# Patient Record
Sex: FEMALE | Race: WHITE | NOT HISPANIC OR LATINO | Employment: FULL TIME | URBAN - METROPOLITAN AREA
[De-identification: names, ages, dates, MRNs, and addresses within clinical notes are randomized per-mention and may not be internally consistent; named-entity substitution may affect disease eponyms.]

---

## 2023-01-24 PROBLEM — R39.9 UTI SYMPTOMS: Status: ACTIVE | Noted: 2023-01-24

## 2023-01-24 PROBLEM — R31.0 GROSS HEMATURIA: Status: ACTIVE | Noted: 2023-01-24

## 2023-01-24 PROBLEM — N20.0 CALCULUS OF KIDNEY: Status: ACTIVE | Noted: 2023-01-24

## 2023-01-24 PROBLEM — R11.0 NAUSEA IN ADULT: Status: ACTIVE | Noted: 2023-01-24

## 2023-01-24 PROBLEM — F41.1 GENERALIZED ANXIETY DISORDER: Status: ACTIVE | Noted: 2023-01-24

## 2023-01-24 PROBLEM — R35.0 URINE FREQUENCY: Status: ACTIVE | Noted: 2023-01-24

## 2023-01-24 PROBLEM — N20.1 LEFT URETERAL STONE: Status: ACTIVE | Noted: 2023-01-24

## 2023-01-24 PROBLEM — N39.41 URGE INCONTINENCE OF URINE: Status: ACTIVE | Noted: 2023-01-24

## 2023-01-24 PROBLEM — R32 URINARY INCONTINENCE IN FEMALE: Status: ACTIVE | Noted: 2023-01-24

## 2023-01-24 PROBLEM — I10 BENIGN ESSENTIAL HTN: Status: ACTIVE | Noted: 2023-01-24

## 2023-01-24 RX ORDER — ALPRAZOLAM 1 MG/1
1 TABLET ORAL 2 TIMES DAILY
COMMUNITY
Start: 2022-07-18 | End: 2023-05-07 | Stop reason: SDUPTHER

## 2023-01-24 RX ORDER — METOPROLOL TARTRATE 50 MG/1
TABLET ORAL
COMMUNITY
Start: 2020-08-03 | End: 2023-03-07 | Stop reason: ALTCHOICE

## 2023-01-24 RX ORDER — HYDROCHLOROTHIAZIDE 12.5 MG/1
1 TABLET ORAL DAILY
COMMUNITY
Start: 2021-11-02 | End: 2023-03-25 | Stop reason: SDUPTHER

## 2023-01-24 RX ORDER — HYDROXYZINE HYDROCHLORIDE 50 MG/1
50 TABLET, FILM COATED ORAL
COMMUNITY
Start: 2020-08-03 | End: 2023-06-05 | Stop reason: ALTCHOICE

## 2023-01-24 RX ORDER — METOPROLOL SUCCINATE 50 MG/1
1 TABLET, EXTENDED RELEASE ORAL DAILY
COMMUNITY
Start: 2021-02-04 | End: 2023-03-07 | Stop reason: SDUPTHER

## 2023-01-24 RX ORDER — FLUOXETINE HYDROCHLORIDE 20 MG/1
1-2 CAPSULE ORAL DAILY
COMMUNITY
Start: 2021-02-04 | End: 2023-06-05 | Stop reason: SDUPTHER

## 2023-01-24 RX ORDER — LOSARTAN POTASSIUM 50 MG/1
1 TABLET ORAL DAILY
COMMUNITY
Start: 2021-02-04 | End: 2023-06-05 | Stop reason: SDUPTHER

## 2023-03-07 ENCOUNTER — OFFICE VISIT (OUTPATIENT)
Dept: PRIMARY CARE | Facility: CLINIC | Age: 58
End: 2023-03-07
Payer: COMMERCIAL

## 2023-03-07 VITALS
HEIGHT: 66 IN | HEART RATE: 72 BPM | TEMPERATURE: 97.1 F | WEIGHT: 163.8 LBS | SYSTOLIC BLOOD PRESSURE: 130 MMHG | DIASTOLIC BLOOD PRESSURE: 92 MMHG | OXYGEN SATURATION: 98 % | BODY MASS INDEX: 26.33 KG/M2

## 2023-03-07 DIAGNOSIS — Z00.00 ROUTINE GENERAL MEDICAL EXAMINATION AT A HEALTH CARE FACILITY: ICD-10-CM

## 2023-03-07 DIAGNOSIS — I10 BENIGN ESSENTIAL HTN: ICD-10-CM

## 2023-03-07 DIAGNOSIS — L70.0 ACNE VULGARIS: ICD-10-CM

## 2023-03-07 DIAGNOSIS — F41.1 GENERALIZED ANXIETY DISORDER: ICD-10-CM

## 2023-03-07 DIAGNOSIS — Z23 ENCOUNTER FOR IMMUNIZATION: Primary | ICD-10-CM

## 2023-03-07 PROCEDURE — 3075F SYST BP GE 130 - 139MM HG: CPT | Performed by: FAMILY MEDICINE

## 2023-03-07 PROCEDURE — 1036F TOBACCO NON-USER: CPT | Performed by: FAMILY MEDICINE

## 2023-03-07 PROCEDURE — 99396 PREV VISIT EST AGE 40-64: CPT | Performed by: FAMILY MEDICINE

## 2023-03-07 PROCEDURE — 3080F DIAST BP >= 90 MM HG: CPT | Performed by: FAMILY MEDICINE

## 2023-03-07 RX ORDER — METOPROLOL SUCCINATE 50 MG/1
50 TABLET, EXTENDED RELEASE ORAL DAILY
Qty: 90 TABLET | Refills: 1 | Status: SHIPPED | OUTPATIENT
Start: 2023-03-07 | End: 2023-06-05 | Stop reason: SDUPTHER

## 2023-03-07 RX ORDER — TRETINOIN 1 MG/G
CREAM TOPICAL NIGHTLY
Qty: 45 G | Refills: 5 | Status: SHIPPED | OUTPATIENT
Start: 2023-03-07 | End: 2023-06-05 | Stop reason: SDUPTHER

## 2023-03-07 ASSESSMENT — PROMIS GLOBAL HEALTH SCALE
RATE_SOCIAL_SATISFACTION: GOOD
RATE_AVERAGE_PAIN: 0
CARRYOUT_PHYSICAL_ACTIVITIES: COMPLETELY
RATE_GENERAL_HEALTH: VERY GOOD
RATE_QUALITY_OF_LIFE: VERY GOOD
EMOTIONAL_PROBLEMS: SOMETIMES
CARRYOUT_SOCIAL_ACTIVITIES: GOOD
RATE_MENTAL_HEALTH: GOOD
RATE_AVERAGE_FATIGUE: MILD
RATE_PHYSICAL_HEALTH: GOOD

## 2023-03-07 ASSESSMENT — PAIN SCALES - GENERAL: PAINLEVEL: 0-NO PAIN

## 2023-03-07 NOTE — PROGRESS NOTES
N Subjective   Patient ID: Aleshia Crespo is a 57 y.o. female who presents for Annual Exam (Pt. Presents for CPE ).  Here for routine physical and  medication follow up.  Doing well.  Work has been good.  In menopause  Exercises regularly.  Wears seatbelt.  Had cologuard in 2022, pap in 2021.  TDaP in 2013.    For anxiety, continues BZD.  Due for  Urine Drug Screen and Controlled Substance Agreement  I have personally reviewed the OARRS report for this patient.  This report is documented into the EMR.  I have considered the risks of abuse, dependence, addiction and diversion.      Anxiety  Presents for follow-up visit. Symptoms include palpitations. Patient reports no chest pain, decreased concentration, depressed mood, dizziness, nervous/anxious behavior, shortness of breath or suicidal ideas. Symptoms occur occasionally. The quality of sleep is good.     Compliance with medications is %.   Hypertension  This is a chronic problem. The current episode started more than 1 year ago. The problem is unchanged. The problem is controlled. Associated symptoms include anxiety, headaches and palpitations. Pertinent negatives include no chest pain or shortness of breath. The current treatment provides significant improvement. There are no compliance problems.        Review of Systems   Constitutional:  Negative for fatigue and fever.   HENT:  Positive for congestion. Negative for hearing loss and rhinorrhea.    Respiratory:  Negative for cough and shortness of breath.    Cardiovascular:  Positive for palpitations. Negative for chest pain.   Gastrointestinal:  Negative for abdominal pain, blood in stool, constipation and diarrhea.   Genitourinary:  Negative for dysuria and frequency.   Musculoskeletal:  Negative for arthralgias and back pain.   Skin:  Negative for rash.   Neurological:  Positive for headaches. Negative for dizziness and seizures.   Psychiatric/Behavioral:  Positive for dysphoric mood. Negative for  "decreased concentration and suicidal ideas. The patient is not nervous/anxious.        Objective   BP (!) 130/92 (BP Location: Left arm, Patient Position: Sitting, BP Cuff Size: Adult)   Pulse 72   Temp 36.2 °C (97.1 °F) (Temporal)   Ht 1.676 m (5' 6\")   Wt 74.3 kg (163 lb 12.8 oz)   SpO2 98%   BMI 26.44 kg/m²     Physical Exam  Constitutional:       General: She is not in acute distress.     Appearance: Normal appearance.   HENT:      Head: Normocephalic and atraumatic.      Right Ear: Tympanic membrane and ear canal normal.      Left Ear: Tympanic membrane and ear canal normal.   Eyes:      Pupils: Pupils are equal, round, and reactive to light.   Cardiovascular:      Rate and Rhythm: Tachycardia present.      Heart sounds: Normal heart sounds. No murmur heard.  Pulmonary:      Effort: Pulmonary effort is normal.      Breath sounds: Normal breath sounds.   Chest:   Breasts:     Right: Normal.      Left: Normal.   Abdominal:      General: Bowel sounds are normal.      Palpations: Abdomen is soft. There is no mass.      Tenderness: There is no abdominal tenderness.   Genitourinary:     General: Normal vulva.      Vagina: Normal.      Cervix: Normal.      Uterus: Normal.       Adnexa: Right adnexa normal and left adnexa normal.   Musculoskeletal:      Cervical back: No rigidity.   Lymphadenopathy:      Cervical: No cervical adenopathy.   Skin:     General: Skin is warm and dry.   Neurological:      Mental Status: She is alert. Mental status is at baseline.      Gait: Gait normal.   Psychiatric:         Mood and Affect: Mood normal.         Behavior: Behavior normal.         Thought Content: Thought content normal.         Judgment: Judgment normal.           Assessment/Plan   Problem List Items Addressed This Visit    None         "

## 2023-03-07 NOTE — PATIENT INSTRUCTIONS
Immunizations recommended:  --Flu shot every fall.  --Shingrix is the shingles vaccine. It can be done at the pharmacy. You may want to check insurance regarding coverage.  --Tetanus every 10 years. Yours was done in 2013, so updated today.  --Covid vaccine.     If your pap smears have been normal, you can do them every 3-5 years, and stop after the age of 65.  Yours was done today.     Colonoscopy should be done every 10 years after the age of 45, unless there was a previous abnormality, or family history of colon cancer.  Alternative is cologuard every 3 years, which you had in March 2022.     Mammogram screening recommendations are changing, but at this time, I recommend a mammogram every 1-2 years in your 40's, and yearly after the age of 50. Having a family history of breast cancer may change that.     You should try to get 150 minutes of exercise weekly.  Be sure to eat a diet rich in fruits and vegetables, and low in saturated fats and sodium.  Strive for a healthy BMI of less than 25.      Make sure to wear sun screen when outside, and check for changing or unusual moles.     Pre-menopause recommendations are for 1000 mg calcium and 1000 units vitamin D3 daily. After menopause calcium recommendation is 1200 mg, with 1000 units of vitamin D3     I recommend you get a Living Will and Durable Power of  for Healthcare. These documents state what care you would want if you couldn't speak for yourself. They help your loved ones in caring for you if that happens.   Once you have those forms completed, you can keep a copy on file with your doctor.     Specialists being seen:  None     BP controlled on current medication.  No change today.  Monitor occasional.  Blood Pressure Treatment goals include:   BP of 120/80, with no higher than 140/85 on consistent basis.   Exercise at 150 minutes weekly.  Eat a balanced diet, rich in fruits and vegetables, low in sodium and processed foods.  If you are overweight,  work toward a goal BMI of less than 25.  Check fasting lipids and BMP some morning prior to eating.     Anxiety controlled with fluoxetine 40 mg daily and alprazolam as needed.  Urine drug screen and controlled substance agreement updated today.  Follow up 3 months.    For acne, Tretinoin cream sent to the pharmacy.

## 2023-03-08 ASSESSMENT — ENCOUNTER SYMPTOMS
DIARRHEA: 0
SHORTNESS OF BREATH: 0
HYPERTENSION: 1
HEADACHES: 1
DYSURIA: 0
ABDOMINAL PAIN: 0
RHINORRHEA: 0
FREQUENCY: 0
DYSPHORIC MOOD: 1
COUGH: 0
ARTHRALGIAS: 0
BLOOD IN STOOL: 0
DEPRESSED MOOD: 0
BACK PAIN: 0
CONSTIPATION: 0
PALPITATIONS: 1
DECREASED CONCENTRATION: 0
NERVOUS/ANXIOUS: 0
SEIZURES: 0
DIZZINESS: 0
FEVER: 0
FATIGUE: 0

## 2023-03-08 NOTE — ASSESSMENT & PLAN NOTE
Stable on current medication.   Urine Drug Screen and Controlled Substance Agreement updated today.  Follow up 3 months.

## 2023-03-25 DIAGNOSIS — I10 BENIGN ESSENTIAL HTN: Primary | ICD-10-CM

## 2023-03-25 RX ORDER — HYDROCHLOROTHIAZIDE 12.5 MG/1
12.5 TABLET ORAL DAILY
Qty: 90 TABLET | Refills: 1 | Status: SHIPPED | OUTPATIENT
Start: 2023-03-25 | End: 2023-06-05 | Stop reason: SDUPTHER

## 2023-05-07 DIAGNOSIS — F41.1 GENERALIZED ANXIETY DISORDER: Primary | ICD-10-CM

## 2023-05-07 RX ORDER — ALPRAZOLAM 1 MG/1
1 TABLET ORAL 2 TIMES DAILY PRN
Qty: 60 TABLET | Refills: 0 | Status: SHIPPED | OUTPATIENT
Start: 2023-05-07 | End: 2023-06-05 | Stop reason: SDUPTHER

## 2023-06-05 ENCOUNTER — TELEMEDICINE (OUTPATIENT)
Dept: PRIMARY CARE | Facility: CLINIC | Age: 58
End: 2023-06-05
Payer: COMMERCIAL

## 2023-06-05 DIAGNOSIS — F41.1 GENERALIZED ANXIETY DISORDER: Primary | ICD-10-CM

## 2023-06-05 DIAGNOSIS — L70.0 ACNE VULGARIS: ICD-10-CM

## 2023-06-05 DIAGNOSIS — I10 BENIGN ESSENTIAL HTN: ICD-10-CM

## 2023-06-05 PROCEDURE — 99213 OFFICE O/P EST LOW 20 MIN: CPT | Performed by: FAMILY MEDICINE

## 2023-06-05 RX ORDER — TRETINOIN 1 MG/G
CREAM TOPICAL NIGHTLY
Qty: 45 G | Refills: 1 | Status: SHIPPED | OUTPATIENT
Start: 2023-06-05 | End: 2023-10-02 | Stop reason: SDUPTHER

## 2023-06-05 RX ORDER — FLUOXETINE HYDROCHLORIDE 20 MG/1
CAPSULE ORAL
Qty: 180 CAPSULE | Refills: 0 | Status: SHIPPED | OUTPATIENT
Start: 2023-06-05 | End: 2023-10-02 | Stop reason: SDUPTHER

## 2023-06-05 RX ORDER — METOPROLOL SUCCINATE 50 MG/1
50 TABLET, EXTENDED RELEASE ORAL DAILY
Qty: 90 TABLET | Refills: 0 | Status: SHIPPED | OUTPATIENT
Start: 2023-06-05 | End: 2023-10-02 | Stop reason: SDUPTHER

## 2023-06-05 RX ORDER — LOSARTAN POTASSIUM 50 MG/1
50 TABLET ORAL DAILY
Qty: 90 TABLET | Refills: 0 | Status: SHIPPED | OUTPATIENT
Start: 2023-06-05 | End: 2023-10-02 | Stop reason: SDUPTHER

## 2023-06-05 RX ORDER — HYDROCHLOROTHIAZIDE 12.5 MG/1
12.5 TABLET ORAL DAILY
Qty: 90 TABLET | Refills: 0 | Status: SHIPPED | OUTPATIENT
Start: 2023-06-05 | End: 2023-10-02 | Stop reason: SDUPTHER

## 2023-06-05 RX ORDER — ALPRAZOLAM 1 MG/1
1 TABLET ORAL 2 TIMES DAILY PRN
Qty: 60 TABLET | Refills: 0 | Status: SHIPPED | OUTPATIENT
Start: 2023-06-05 | End: 2023-07-03 | Stop reason: SDUPTHER

## 2023-06-05 ASSESSMENT — ANXIETY QUESTIONNAIRES
1. FEELING NERVOUS, ANXIOUS, OR ON EDGE: SEVERAL DAYS
IF YOU CHECKED OFF ANY PROBLEMS ON THIS QUESTIONNAIRE, HOW DIFFICULT HAVE THESE PROBLEMS MADE IT FOR YOU TO DO YOUR WORK, TAKE CARE OF THINGS AT HOME, OR GET ALONG WITH OTHER PEOPLE: SOMEWHAT DIFFICULT
7. FEELING AFRAID AS IF SOMETHING AWFUL MIGHT HAPPEN: NOT AT ALL
5. BEING SO RESTLESS THAT IT IS HARD TO SIT STILL: NOT AT ALL
2. NOT BEING ABLE TO STOP OR CONTROL WORRYING: SEVERAL DAYS
6. BECOMING EASILY ANNOYED OR IRRITABLE: NOT AT ALL
GAD7 TOTAL SCORE: 2
3. WORRYING TOO MUCH ABOUT DIFFERENT THINGS: NOT AT ALL
4. TROUBLE RELAXING: NOT AT ALL

## 2023-06-05 NOTE — PROGRESS NOTES
Subjective   Patient ID: Aleshia Crespo is a 57 y.o. female who presents for Anxiety.  OARRS:  Latoya South MD on 6/5/2023 11:35 AM  I have personally reviewed the OARRS report for Aleshia Crespo. I have considered the risks of abuse, dependence, addiction and diversion and I believe that it is clinically appropriate for Aleshia Crespo to be prescribed this medication    Is the patient prescribed a combination of a benzodiazepine and opioid?  No    Last Urine Drug Screen / ordered today: No  Recent Results (from the past 02029 hour(s))  -OPIATE/OPIOID/BENZO PRESCRIPTION COMPLIANCE:   Collection Time: 03/01/22  2:37 PM       Result                      Value             Ref Range           DRUG SCREEN COMMENT UR*     SEE BELOW                             Creatine, Urine             150.4             mg/dL               Amphetamine Screen, Ur*                       NEGATIVE        PRESUMPTIVE NEGATIVE       Barbiturate Screen, Ur*                       NEGATIVE        PRESUMPTIVE NEGATIVE       Cannabinoid Screen, Ur*                       NEGATIVE        PRESUMPTIVE NEGATIVE       Cocaine Screen, Urine                         NEGATIVE        PRESUMPTIVE NEGATIVE       PCP Screen, Urine                             NEGATIVE        PRESUMPTIVE NEGATIVE       7-Aminoclonazepam           <25               Cutoff <25 n*       Alpha-Hydroxyalprazolam     322 (A)           Cutoff <25 n*       Alpha-Hydroxymidazolam      <25               Cutoff <25 n*       Alprazolam                  295 (A)           Cutoff <25 n*       Chlordiazepoxide            <25               Cutoff <25 n*       Clonazepam                  <25               Cutoff <25 n*       Diazepam                    <25               Cutoff <25 n*       Lorazepam                   <25               Cutoff <25 n*       Midazolam                   <25               Cutoff <25 n*       Nordiazepam                 <25               Cutoff <25 n*        Oxazepam                    <25               Cutoff <25 n*       Temazepam                   <25               Cutoff <25 n*       Zolpidem                    <25               Cutoff <25 n*       Zolpidem Metabolite (Z*     <25               Cutoff <25 n*       6-Acetylmorphine            <25               Cutoff <25 n*       Codeine                     <50               Cutoff <50 n*       Hydrocodone                 <25               Cutoff <25 n*       Hydromorphone               <25               Cutoff <25 n*       Morphine Urine              <50               Cutoff <50 n*       Norhydrocodone              <25               Cutoff <25 n*       Noroxycodone                <25               Cutoff <25 n*       Oxycodone                   <25               Cutoff <25 n*       Oxymorphone                 <25               Cutoff <25 n*       Tramadol                    <50               Cutoff <50 n*       O-Desmethyltramadol         <50               Cutoff <50 n*       Fentanyl                    <2.5              Cutoff<2.5 n*       Norfentanyl                 <2.5              Cutoff<2.5 n*       METHADONE CONFIRMATION*     <25               Cutoff <25 n*       EDDP                        <25               Cutoff <25 n*  Results are as expected.     Controlled Substance Agreement:  Date of the Last Agreement: 3/7/22  Reviewed Controlled Substance Agreement including but not limited to the benefits, risks, and alternatives to treatment with a Controlled Substance medication(s).    Benzodiazepines:  What is the patient's goal of therapy? Help chronic anxiety  Is this being achieved with current treatment? yes    LAMONT-7:  Over the last 2 weeks, how often have you been bothered by any of the following problems?  Feeling nervous, anxious, or on edge: 1  Not being able to stop or control worryin  Worrying too much about different things: 0  Trouble relaxin  Being so restless that it is hard to sit still:  0  Becoming easily annoyed or irritable: 0  Feeling afraid as if something awful might happen: 0  LAMONT-7 Total Score: 2      Activities of Daily Living:   Is your overall impression that this patient is benefiting (symptom reduction outweighs side effects) from benzodiazepine therapy? Yes     1. Physical Functioning: Better  2. Family Relationship: Better  3. Social Relationship: Better  4. Mood: Better  5. Sleep Patterns: Better  6. Overall Function: Better    Has been under more stress lately.   Changed jobs, working at [employer].    Is more tired.  Sleeps well.            Review of Systems    Objective   There were no vitals taken for this visit.    Physical Exam  Vitals reviewed.   Constitutional:       Appearance: Normal appearance.   Neurological:      Mental Status: She is alert.   Psychiatric:         Mood and Affect: Mood normal.         Behavior: Behavior normal.           Assessment/Plan   Problem List Items Addressed This Visit          Circulatory    Benign essential HTN    Relevant Medications    losartan (Cozaar) 50 mg tablet    hydroCHLOROthiazide (HYDRODiuril) 12.5 mg tablet    metoprolol succinate XL (Toprol-XL) 50 mg 24 hr tablet       Other    Generalized anxiety disorder - Primary    Relevant Medications    ALPRAZolam (Xanax) 1 mg tablet    FLUoxetine (PROzac) 20 mg capsule     Other Visit Diagnoses       Acne vulgaris        Relevant Medications    tretinoin (Retin-A) 0.1 % cream          Chronic anxiety,  worsened by recent stress.  Medications to help symptoms.  Refill sent to pharmacy.   Urine Drug Screen and Controlled Substance Agreement  done in March this year.  Follow up 3 months.    For BP, continue hydrochlorothiazide 12.5 mg, losartan 50 mg, metoprolol ER 50 mg,  Monitor BP at home if able.

## 2023-06-05 NOTE — PATIENT INSTRUCTIONS
Chronic anxiety,  worsened by recent stress.  Medications to help symptoms.  Refill sent to pharmacy.   Urine Drug Screen and Controlled Substance Agreement  done in March this year.  Follow up 3 months.    For BP, continue hydrochlorothiazide 12.5 mg, losartan 50 mg, metoprolol ER 50 mg,  Monitor BP at home if able.

## 2023-06-14 ENCOUNTER — TELEPHONE (OUTPATIENT)
Dept: PRIMARY CARE | Facility: CLINIC | Age: 58
End: 2023-06-14
Payer: COMMERCIAL

## 2023-06-14 DIAGNOSIS — T14.8XXA MUSCLE STRAIN: Primary | ICD-10-CM

## 2023-06-14 RX ORDER — NAPROXEN 500 MG/1
500 TABLET ORAL 2 TIMES DAILY PRN
Qty: 10 TABLET | Refills: 0 | Status: SHIPPED | OUTPATIENT
Start: 2023-06-14 | End: 2023-07-04

## 2023-07-03 DIAGNOSIS — F41.1 GENERALIZED ANXIETY DISORDER: ICD-10-CM

## 2023-07-03 RX ORDER — ALPRAZOLAM 1 MG/1
1 TABLET ORAL 2 TIMES DAILY PRN
Qty: 180 TABLET | Refills: 0 | Status: SHIPPED | OUTPATIENT
Start: 2023-07-03 | End: 2023-10-02 | Stop reason: SDUPTHER

## 2023-10-02 DIAGNOSIS — F41.1 GENERALIZED ANXIETY DISORDER: ICD-10-CM

## 2023-10-02 DIAGNOSIS — I10 BENIGN ESSENTIAL HTN: ICD-10-CM

## 2023-10-02 DIAGNOSIS — L70.0 ACNE VULGARIS: ICD-10-CM

## 2023-10-02 RX ORDER — TRETINOIN 1 MG/G
CREAM TOPICAL NIGHTLY
Qty: 45 G | Refills: 1 | Status: SHIPPED | OUTPATIENT
Start: 2023-10-02 | End: 2024-10-01

## 2023-10-02 RX ORDER — LOSARTAN POTASSIUM 50 MG/1
50 TABLET ORAL DAILY
Qty: 90 TABLET | Refills: 0 | Status: SHIPPED | OUTPATIENT
Start: 2023-10-02 | End: 2024-01-03 | Stop reason: SDUPTHER

## 2023-10-02 RX ORDER — FLUOXETINE HYDROCHLORIDE 20 MG/1
CAPSULE ORAL
Qty: 180 CAPSULE | Refills: 0 | Status: SHIPPED | OUTPATIENT
Start: 2023-10-02 | End: 2024-01-03 | Stop reason: SDUPTHER

## 2023-10-02 RX ORDER — HYDROCHLOROTHIAZIDE 12.5 MG/1
12.5 TABLET ORAL DAILY
Qty: 90 TABLET | Refills: 0 | Status: SHIPPED | OUTPATIENT
Start: 2023-10-02 | End: 2024-01-03 | Stop reason: SDUPTHER

## 2023-10-02 RX ORDER — ALPRAZOLAM 1 MG/1
1 TABLET ORAL 2 TIMES DAILY PRN
Qty: 180 TABLET | Refills: 0 | Status: SHIPPED | OUTPATIENT
Start: 2023-10-02 | End: 2024-01-03 | Stop reason: SDUPTHER

## 2023-10-02 RX ORDER — METOPROLOL SUCCINATE 50 MG/1
50 TABLET, EXTENDED RELEASE ORAL DAILY
Qty: 90 TABLET | Refills: 0 | Status: SHIPPED | OUTPATIENT
Start: 2023-10-02 | End: 2024-01-03 | Stop reason: SDUPTHER

## 2023-10-12 DIAGNOSIS — Z12.31 BREAST CANCER SCREENING BY MAMMOGRAM: Primary | ICD-10-CM

## 2024-01-02 DIAGNOSIS — F41.1 GENERALIZED ANXIETY DISORDER: ICD-10-CM

## 2024-01-02 DIAGNOSIS — I10 BENIGN ESSENTIAL HTN: ICD-10-CM

## 2024-01-03 RX ORDER — ALPRAZOLAM 1 MG/1
1 TABLET ORAL 2 TIMES DAILY PRN
Qty: 60 TABLET | Refills: 0 | Status: SHIPPED | OUTPATIENT
Start: 2024-01-03 | End: 2024-02-06 | Stop reason: SDUPTHER

## 2024-01-03 RX ORDER — FLUOXETINE HYDROCHLORIDE 20 MG/1
CAPSULE ORAL
Qty: 180 CAPSULE | Refills: 0 | Status: SHIPPED | OUTPATIENT
Start: 2024-01-03 | End: 2024-05-08

## 2024-01-03 RX ORDER — LOSARTAN POTASSIUM 50 MG/1
50 TABLET ORAL DAILY
Qty: 90 TABLET | Refills: 0 | Status: SHIPPED | OUTPATIENT
Start: 2024-01-03

## 2024-01-03 RX ORDER — HYDROCHLOROTHIAZIDE 12.5 MG/1
12.5 TABLET ORAL DAILY
Qty: 90 TABLET | Refills: 0 | Status: SHIPPED | OUTPATIENT
Start: 2024-01-03

## 2024-01-03 RX ORDER — METOPROLOL SUCCINATE 50 MG/1
50 TABLET, EXTENDED RELEASE ORAL DAILY
Qty: 90 TABLET | Refills: 0 | Status: SHIPPED | OUTPATIENT
Start: 2024-01-03

## 2024-02-05 ENCOUNTER — OFFICE VISIT (OUTPATIENT)
Dept: PRIMARY CARE | Facility: CLINIC | Age: 59
End: 2024-02-05
Payer: COMMERCIAL

## 2024-02-05 VITALS
DIASTOLIC BLOOD PRESSURE: 80 MMHG | SYSTOLIC BLOOD PRESSURE: 128 MMHG | HEART RATE: 87 BPM | WEIGHT: 160.2 LBS | TEMPERATURE: 98.2 F | HEIGHT: 66 IN | BODY MASS INDEX: 25.75 KG/M2 | OXYGEN SATURATION: 98 %

## 2024-02-05 DIAGNOSIS — I10 BENIGN ESSENTIAL HTN: Primary | ICD-10-CM

## 2024-02-05 DIAGNOSIS — F41.1 GENERALIZED ANXIETY DISORDER: ICD-10-CM

## 2024-02-05 PROCEDURE — 80354 DRUG SCREENING FENTANYL: CPT

## 2024-02-05 PROCEDURE — 99213 OFFICE O/P EST LOW 20 MIN: CPT | Performed by: FAMILY MEDICINE

## 2024-02-05 PROCEDURE — 80365 DRUG SCREENING OXYCODONE: CPT

## 2024-02-05 PROCEDURE — 80368 SEDATIVE HYPNOTICS: CPT

## 2024-02-05 PROCEDURE — 80358 DRUG SCREENING METHADONE: CPT

## 2024-02-05 PROCEDURE — 3008F BODY MASS INDEX DOCD: CPT | Performed by: FAMILY MEDICINE

## 2024-02-05 PROCEDURE — 82570 ASSAY OF URINE CREATININE: CPT

## 2024-02-05 PROCEDURE — 80373 DRUG SCREENING TRAMADOL: CPT

## 2024-02-05 PROCEDURE — 3074F SYST BP LT 130 MM HG: CPT | Performed by: FAMILY MEDICINE

## 2024-02-05 PROCEDURE — 80361 OPIATES 1 OR MORE: CPT

## 2024-02-05 PROCEDURE — 80307 DRUG TEST PRSMV CHEM ANLYZR: CPT

## 2024-02-05 PROCEDURE — 80346 BENZODIAZEPINES1-12: CPT

## 2024-02-05 PROCEDURE — 1036F TOBACCO NON-USER: CPT | Performed by: FAMILY MEDICINE

## 2024-02-05 PROCEDURE — 3079F DIAST BP 80-89 MM HG: CPT | Performed by: FAMILY MEDICINE

## 2024-02-05 ASSESSMENT — ANXIETY QUESTIONNAIRES
6. BECOMING EASILY ANNOYED OR IRRITABLE: NOT AT ALL
3. WORRYING TOO MUCH ABOUT DIFFERENT THINGS: NOT AT ALL
1. FEELING NERVOUS, ANXIOUS, OR ON EDGE: NOT AT ALL
4. TROUBLE RELAXING: NOT AT ALL
2. NOT BEING ABLE TO STOP OR CONTROL WORRYING: NOT AT ALL
5. BEING SO RESTLESS THAT IT IS HARD TO SIT STILL: NOT AT ALL

## 2024-02-05 ASSESSMENT — PATIENT HEALTH QUESTIONNAIRE - PHQ9
10. IF YOU CHECKED OFF ANY PROBLEMS, HOW DIFFICULT HAVE THESE PROBLEMS MADE IT FOR YOU TO DO YOUR WORK, TAKE CARE OF THINGS AT HOME, OR GET ALONG WITH OTHER PEOPLE: NOT DIFFICULT AT ALL
1. LITTLE INTEREST OR PLEASURE IN DOING THINGS: NOT AT ALL
2. FEELING DOWN, DEPRESSED OR HOPELESS: SEVERAL DAYS
SUM OF ALL RESPONSES TO PHQ9 QUESTIONS 1 AND 2: 1

## 2024-02-05 ASSESSMENT — ENCOUNTER SYMPTOMS
LOSS OF SENSATION IN FEET: 0
DEPRESSION: 1
OCCASIONAL FEELINGS OF UNSTEADINESS: 0

## 2024-02-05 NOTE — PROGRESS NOTES
Subjective   Patient ID: Aleshia Crespo is a 58 y.o. female who presents for Med Refill.  OARRS:  Latoya South MD on 2024  4:30 PM  I have personally reviewed the OARRS report for Aleshia Crespo. I have considered the risks of abuse, dependence, addiction and diversion and I believe that it is clinically appropriate for Aleshia Crespo to be prescribed this medication    Is the patient prescribed a combination of a benzodiazepine and opioid?  No    Last Urine Drug Screen / ordered today: Yes  No results found for this or any previous visit (from the past 8760 hour(s)).  Results are as expected.     Controlled Substance Agreement:  Date of the Last Agreement: 24  Reviewed Controlled Substance Agreement including but not limited to the benefits, risks, and alternatives to treatment with a Controlled Substance medication(s).    Benzodiazepines:  What is the patient's goal of therapy? Help  anxiety  Is this being achieved with current treatment? yes    LAMONT-7:  Over the last 2 weeks, how often have you been bothered by any of the following problems?  Feeling nervous, anxious, or on edge: 0  Not being able to stop or control worryin  Worrying too much about different things: 0  Trouble relaxin  Being so restless that it is hard to sit still: 0  Becoming easily annoyed or irritable: 0      Activities of Daily Living:   Is your overall impression that this patient is benefiting (symptom reduction outweighs side effects) from benzodiazepine therapy? Yes     1. Physical Functioning: Better  2. Family Relationship: Better  3. Social Relationship: Better  4. Mood: Better  5. Sleep Patterns: Better  6. Overall Function: Better    Anxiety is good when not at work.  Has life stress, but can handle it.  Most anxiety comes from job, working at  [employer] now.  Likes the work, but is very stressful, short  staffed which increases anxiety.  Taking fluoxetine  as well.  Sleep is  OK    BP has been  "controlled.  Continues on medication,  No side effects.          Review of Systems    Objective   /80   Pulse 87   Temp 36.8 °C (98.2 °F)   Ht 1.676 m (5' 6\")   Wt 72.7 kg (160 lb 3.2 oz)   SpO2 98%   BMI 25.86 kg/m²     Physical Exam  Vitals reviewed.   Constitutional:       Appearance: Normal appearance.   Neurological:      Mental Status: She is alert.   Psychiatric:         Mood and Affect: Mood normal.         Behavior: Behavior normal.           Assessment/Plan   Problem List Items Addressed This Visit       Benign essential HTN - Primary    Generalized anxiety disorder    Relevant Medications    ALPRAZolam (Xanax) 1 mg tablet    Other Relevant Orders    Opiate/Opioid/Benzo Prescription Compliance    OOB Internal Tracking     Other Visit Diagnoses       Body mass index (BMI) of 25.0-25.9 in adult                   "

## 2024-02-06 LAB
AMPHETAMINES UR QL SCN: NORMAL
BARBITURATES UR QL SCN: NORMAL
BZE UR QL SCN: NORMAL
CANNABINOIDS UR QL SCN: NORMAL
CREAT UR-MCNC: 116.8 MG/DL (ref 20–320)
PCP UR QL SCN: NORMAL

## 2024-02-06 RX ORDER — ALPRAZOLAM 1 MG/1
1 TABLET ORAL 2 TIMES DAILY PRN
Qty: 60 TABLET | Refills: 0 | Status: SHIPPED | OUTPATIENT
Start: 2024-02-06 | End: 2024-03-05 | Stop reason: SDUPTHER

## 2024-02-06 NOTE — PATIENT INSTRUCTIONS
Chronic anxiety,  worsened by stress.  Continue fluoxetine daily, alprazolam as needed.  Refill sent to pharmacy.   Urine Drug Screen and Controlled Substance Agreement  done today.  Follow up 3 months.     BP controlled  on hydrochlorothiazide 12.5 mg, losartan 50 mg, metoprolol ER 50 mg,  Monitor BP at home if able.  No change in medication.

## 2024-02-09 LAB
1OH-MIDAZOLAM UR CFM-MCNC: <25 NG/ML
6MAM UR CFM-MCNC: <25 NG/ML
7AMINOCLONAZEPAM UR CFM-MCNC: <25 NG/ML
A-OH ALPRAZ UR CFM-MCNC: 376 NG/ML
ALPRAZ UR CFM-MCNC: 276 NG/ML
CHLORDIAZEP UR CFM-MCNC: <25 NG/ML
CLONAZEPAM UR CFM-MCNC: <25 NG/ML
CODEINE UR CFM-MCNC: <50 NG/ML
DIAZEPAM UR CFM-MCNC: <25 NG/ML
EDDP UR CFM-MCNC: <25 NG/ML
FENTANYL UR CFM-MCNC: <2.5 NG/ML
HYDROCODONE CTO UR CFM-MCNC: <25 NG/ML
HYDROMORPHONE UR CFM-MCNC: <25 NG/ML
LORAZEPAM UR CFM-MCNC: <25 NG/ML
METHADONE UR CFM-MCNC: <25 NG/ML
MIDAZOLAM UR CFM-MCNC: <25 NG/ML
MORPHINE UR CFM-MCNC: <50 NG/ML
NORDIAZEPAM UR CFM-MCNC: <25 NG/ML
NORFENTANYL UR CFM-MCNC: <2.5 NG/ML
NORHYDROCODONE UR CFM-MCNC: <25 NG/ML
NOROXYCODONE UR CFM-MCNC: <25 NG/ML
NORTRAMADOL UR-MCNC: <50 NG/ML
OXAZEPAM UR CFM-MCNC: <25 NG/ML
OXYCODONE UR CFM-MCNC: <25 NG/ML
OXYMORPHONE UR CFM-MCNC: <25 NG/ML
TEMAZEPAM UR CFM-MCNC: <25 NG/ML
TRAMADOL UR CFM-MCNC: <50 NG/ML
ZOLPIDEM UR CFM-MCNC: <25 NG/ML
ZOLPIDEM UR-MCNC: <25 NG/ML

## 2024-03-05 DIAGNOSIS — F41.1 GENERALIZED ANXIETY DISORDER: ICD-10-CM

## 2024-03-05 RX ORDER — ALPRAZOLAM 1 MG/1
1 TABLET ORAL 2 TIMES DAILY PRN
Qty: 60 TABLET | Refills: 0 | Status: SHIPPED | OUTPATIENT
Start: 2024-03-05 | End: 2024-04-04 | Stop reason: SDUPTHER

## 2024-04-04 DIAGNOSIS — F41.1 GENERALIZED ANXIETY DISORDER: ICD-10-CM

## 2024-04-04 RX ORDER — ALPRAZOLAM 1 MG/1
1 TABLET ORAL 2 TIMES DAILY PRN
Qty: 180 TABLET | Refills: 0 | Status: SHIPPED | OUTPATIENT
Start: 2024-04-04 | End: 2024-07-03

## 2024-05-08 DIAGNOSIS — F41.1 GENERALIZED ANXIETY DISORDER: ICD-10-CM

## 2024-05-08 RX ORDER — FLUOXETINE HYDROCHLORIDE 20 MG/1
CAPSULE ORAL
Qty: 180 CAPSULE | Refills: 0 | Status: SHIPPED | OUTPATIENT
Start: 2024-05-08

## 2024-06-19 DIAGNOSIS — F41.1 GENERALIZED ANXIETY DISORDER: ICD-10-CM

## 2024-06-19 RX ORDER — ALPRAZOLAM 1 MG/1
1 TABLET ORAL 2 TIMES DAILY PRN
Qty: 60 TABLET | Refills: 0 | Status: SHIPPED | OUTPATIENT
Start: 2024-06-19 | End: 2024-07-19

## 2024-06-19 RX ORDER — ALPRAZOLAM 1 MG/1
TABLET ORAL
Qty: 180 TABLET | Refills: 0 | OUTPATIENT
Start: 2024-06-19

## 2024-07-16 ENCOUNTER — APPOINTMENT (OUTPATIENT)
Dept: PRIMARY CARE | Facility: CLINIC | Age: 59
End: 2024-07-16
Payer: COMMERCIAL

## 2024-07-16 DIAGNOSIS — F41.1 GENERALIZED ANXIETY DISORDER: Primary | ICD-10-CM

## 2024-07-16 DIAGNOSIS — I10 BENIGN ESSENTIAL HTN: ICD-10-CM

## 2024-07-16 PROCEDURE — 99213 OFFICE O/P EST LOW 20 MIN: CPT | Performed by: FAMILY MEDICINE

## 2024-07-16 RX ORDER — FLUOXETINE HYDROCHLORIDE 20 MG/1
CAPSULE ORAL
Qty: 180 CAPSULE | Refills: 1 | Status: SHIPPED | OUTPATIENT
Start: 2024-07-16

## 2024-07-16 NOTE — PROGRESS NOTES
Subjective   Patient ID: Aleshia Crespo is a 58 y.o. female who presents for Anxiety.  Patient being seen for follow up of anxiety.  Continues on fluoxetine 40 mg daily, and alprazolam twice a day at most.  Had a few panic attacks a month ago, but have been better recently   Increased stress will trigger.  Dad had been worse, and in SNF, but home now.    BP has been good. 125/92 today.  Taking hydrochlorothiazide, metoprolol, losartan.  No side effects      OARRS:  Latoya South MD on 7/16/2024  4:33 PM  I have personally reviewed the OARRS report for Aleshia Crespo. I have considered the risks of abuse, dependence, addiction and diversion and I believe that it is clinically appropriate for Aleshia Crespo to be prescribed this medication    Is the patient prescribed a combination of a benzodiazepine and opioid?  Yes, I feel it is clincially indicated to continue the medication and have discussed with the patient risks/benefits/alternatives.    Last Urine Drug Screen / ordered today: No  Recent Results (from the past 8760 hour(s))  -Confirmation Opiate/Opioid/Benzo Prescription Compliance:   Collection Time: 02/05/24  4:08 PM       Result                      Value             Ref Range           Clonazepam                  <25               <25 ng/mL           7-Aminoclonazepam           <25               <25 ng/mL           Alprazolam                  276 (H)           <25 ng/mL           Alpha-Hydroxyalprazolam     376 (H)           <25 ng/mL           Midazolam                   <25               <25 ng/mL           Alpha-Hydroxymidazolam      <25               <25 ng/mL           Chlordiazepoxide            <25               <25 ng/mL           Diazepam                    <25               <25 ng/mL           Nordiazepam                 <25               <25 ng/mL           Temazepam                   <25               <25 ng/mL           Oxazepam                    <25               <25 ng/mL            Lorazepam                   <25               <25 ng/mL           Methadone                   <25               <25 ng/mL           EDDP                        <25               <25 ng/mL           6-Acetylmorphine            <25               <25 ng/mL           Codeine                     <50               <50 ng/mL           Hydrocodone                 <25               <25 ng/mL           Hydromorphone               <25               <25 ng/mL           Morphine                    <50               <50 ng/mL           Norhydrocodone              <25               <25 ng/mL           Noroxycodone                <25               <25 ng/mL           Oxycodone                   <25               <25 ng/mL           Oxymorphone                 <25               <25 ng/mL           Fentanyl                    <2.5              <2.5 ng/mL          Norfentanyl                 <2.5              <2.5 ng/mL          Tramadol                    <50               <50 ng/mL           O-Desmethyltramadol         <50               <50 ng/mL           Zolpidem                    <25               <25 ng/mL           Zolpidem Metabolite (Z*     <25               <25 ng/mL      -Screen Opiate/Opioid/Benzo Prescription Compliance:   Collection Time: 02/05/24  4:08 PM       Result                      Value             Ref Range           Creatinine, Urine Rand*     116.8             20.0 - 320.0*       Amphetamine Screen, Ur*                       Presumptive *   Presumptive Negative       Barbiturate Screen, Ur*                       Presumptive *   Presumptive Negative       Cannabinoid Screen, Ur*                       Presumptive *   Presumptive Negative       Cocaine Metabolite Scr*                       Presumptive *   Presumptive Negative       PCP Screen, Urine                             Presumptive *   Presumptive Negative  Results are as expected.         Controlled Substance Agreement:  Date of the Last  Agreement: 2/5/24  Reviewed Controlled Substance Agreement including but not limited to the benefits, risks, and alternatives to treatment with a Controlled Substance medication(s).    Benzodiazepines:  What is the patient's goal of therapy? Help manage anxiety  Is this being achieved with current treatment? yes    LAMONT-7:  No data recorded    Activities of Daily Living:   Is your overall impression that this patient is benefiting (symptom reduction outweighs side effects) from benzodiazepine therapy? Yes     1. Physical Functioning: Better  2. Family Relationship: Better  3. Social Relationship: Better  4. Mood: Better  5. Sleep Patterns: Better  6. Overall Function: Better          Review of Systems    Objective   There were no vitals taken for this visit.    Physical Exam  Constitutional:       Appearance: Normal appearance.   Neurological:      Mental Status: She is alert.   Psychiatric:         Mood and Affect: Mood normal.         Behavior: Behavior normal.         Thought Content: Thought content normal.         Judgment: Judgment normal.           Assessment/Plan   Problem List Items Addressed This Visit       Benign essential HTN    Generalized anxiety disorder - Primary    Relevant Medications    FLUoxetine (PROzac) 20 mg capsule

## 2024-07-17 PROBLEM — R39.9 UTI SYMPTOMS: Status: RESOLVED | Noted: 2023-01-24 | Resolved: 2024-07-17

## 2024-07-17 NOTE — PATIENT INSTRUCTIONS
Chronic anxiety,  worsened by stress.  Continue fluoxetine daily, alprazolam as needed.   Urine Drug Screen and Controlled Substance Agreement  done 2/5/24  Follow up 3 months.     BP controlled  on hydrochlorothiazide 12.5 mg, losartan 50 mg, metoprolol ER 50 mg,  Monitor BP at home periodically.  No change in medication.

## 2024-07-18 DIAGNOSIS — I10 BENIGN ESSENTIAL HTN: Primary | ICD-10-CM

## 2024-08-04 DIAGNOSIS — F41.1 GENERALIZED ANXIETY DISORDER: ICD-10-CM

## 2024-08-04 RX ORDER — ALPRAZOLAM 1 MG/1
1 TABLET ORAL 2 TIMES DAILY PRN
Qty: 180 TABLET | Refills: 0 | Status: SHIPPED | OUTPATIENT
Start: 2024-08-04 | End: 2024-11-02

## 2024-08-06 DIAGNOSIS — R74.8 ELEVATED ALKALINE PHOSPHATASE LEVEL: Primary | ICD-10-CM

## 2024-08-08 ENCOUNTER — HOSPITAL ENCOUNTER (OUTPATIENT)
Dept: RADIOLOGY | Facility: EXTERNAL LOCATION | Age: 59
Discharge: HOME | End: 2024-08-08

## 2024-08-08 DIAGNOSIS — G89.29 CHRONIC PAIN OF LEFT KNEE: ICD-10-CM

## 2024-08-08 DIAGNOSIS — M25.572 CHRONIC PAIN OF LEFT ANKLE: ICD-10-CM

## 2024-08-08 DIAGNOSIS — M25.562 CHRONIC PAIN OF LEFT KNEE: Primary | ICD-10-CM

## 2024-08-08 DIAGNOSIS — G89.29 CHRONIC PAIN OF LEFT KNEE: Primary | ICD-10-CM

## 2024-08-08 DIAGNOSIS — G89.29 CHRONIC PAIN OF LEFT ANKLE: ICD-10-CM

## 2024-08-08 DIAGNOSIS — M25.562 CHRONIC PAIN OF LEFT KNEE: ICD-10-CM

## 2024-11-05 ENCOUNTER — HOSPITAL ENCOUNTER (OUTPATIENT)
Dept: RADIOLOGY | Facility: EXTERNAL LOCATION | Age: 59
Discharge: HOME | End: 2024-11-05

## 2024-11-05 DIAGNOSIS — Z12.31 BREAST CANCER SCREENING BY MAMMOGRAM: Primary | ICD-10-CM

## 2024-11-05 DIAGNOSIS — F41.1 GENERALIZED ANXIETY DISORDER: ICD-10-CM

## 2024-11-05 DIAGNOSIS — Z12.31 BREAST CANCER SCREENING BY MAMMOGRAM: ICD-10-CM

## 2024-11-05 RX ORDER — FLUOXETINE HYDROCHLORIDE 20 MG/1
CAPSULE ORAL
Qty: 180 CAPSULE | Refills: 1 | Status: SHIPPED | OUTPATIENT
Start: 2024-11-05

## 2024-11-05 RX ORDER — ALPRAZOLAM 1 MG/1
1 TABLET ORAL 2 TIMES DAILY PRN
Qty: 60 TABLET | Refills: 0 | Status: SHIPPED | OUTPATIENT
Start: 2024-11-05 | End: 2024-12-05

## 2024-12-04 DIAGNOSIS — F41.1 GENERALIZED ANXIETY DISORDER: ICD-10-CM

## 2024-12-05 RX ORDER — ALPRAZOLAM 1 MG/1
1 TABLET ORAL 2 TIMES DAILY PRN
Qty: 60 TABLET | Refills: 0 | Status: SHIPPED | OUTPATIENT
Start: 2024-12-05 | End: 2025-01-04

## 2025-01-09 ENCOUNTER — APPOINTMENT (OUTPATIENT)
Dept: PRIMARY CARE | Facility: CLINIC | Age: 60
End: 2025-01-09
Payer: COMMERCIAL

## 2025-01-09 DIAGNOSIS — Z12.11 COLON CANCER SCREENING: ICD-10-CM

## 2025-01-09 DIAGNOSIS — F41.1 GENERALIZED ANXIETY DISORDER: Primary | ICD-10-CM

## 2025-01-09 PROCEDURE — 3079F DIAST BP 80-89 MM HG: CPT | Performed by: FAMILY MEDICINE

## 2025-01-09 PROCEDURE — 1036F TOBACCO NON-USER: CPT | Performed by: FAMILY MEDICINE

## 2025-01-09 PROCEDURE — 3008F BODY MASS INDEX DOCD: CPT | Performed by: FAMILY MEDICINE

## 2025-01-09 PROCEDURE — 3074F SYST BP LT 130 MM HG: CPT | Performed by: FAMILY MEDICINE

## 2025-01-09 PROCEDURE — 99213 OFFICE O/P EST LOW 20 MIN: CPT | Performed by: FAMILY MEDICINE

## 2025-01-09 ASSESSMENT — PATIENT HEALTH QUESTIONNAIRE - PHQ9
1. LITTLE INTEREST OR PLEASURE IN DOING THINGS: NOT AT ALL
2. FEELING DOWN, DEPRESSED OR HOPELESS: NOT AT ALL
SUM OF ALL RESPONSES TO PHQ9 QUESTIONS 1 AND 2: 0

## 2025-01-09 ASSESSMENT — ENCOUNTER SYMPTOMS
DEPRESSION: 0
OCCASIONAL FEELINGS OF UNSTEADINESS: 0
LOSS OF SENSATION IN FEET: 0

## 2025-01-09 NOTE — PROGRESS NOTES
Subjective   Patient ID: Aleshia Crespo is a 59 y.o. female who presents for Follow-up.  Patient presents for follow up on anxiety.  Currently she is stable with fluoxetine and alprazolam.  No side effects to medication.  Sleep and appetite are OK.    Had right foot surgery before Thanksgiving.  Has been dealing with that.     Having  daily headache, so was concerned about BP.  At home will get numbers up to 165 systolic.  Has a wrist cuff.  Advil and tylenol will help temporarily, then it comes back.  Is around the whole head.  No neuro symptoms.    OARRS:  No data recorded  I have personally reviewed the OARRS report for Aleshia Crespo. I have considered the risks of abuse, dependence, addiction and diversion and I believe that it is clinically appropriate for Aleshia Crespo to be prescribed this medication    Is the patient prescribed a combination of a benzodiazepine and opioid?  No    Last Urine Drug Screen / ordered today: Yes  Recent Results (from the past 8760 hours)  -Confirmation Opiate/Opioid/Benzo Prescription Compliance:   Collection Time: 02/05/24  4:08 PM       Result                      Value             Ref Range           Clonazepam                  <25               <25 ng/mL           7-Aminoclonazepam           <25               <25 ng/mL           Alprazolam                  276 (H)           <25 ng/mL           Alpha-Hydroxyalprazolam     376 (H)           <25 ng/mL           Midazolam                   <25               <25 ng/mL           Alpha-Hydroxymidazolam      <25               <25 ng/mL           Chlordiazepoxide            <25               <25 ng/mL           Diazepam                    <25               <25 ng/mL           Nordiazepam                 <25               <25 ng/mL           Temazepam                   <25               <25 ng/mL           Oxazepam                    <25               <25 ng/mL           Lorazepam                   <25               <25  ng/mL           Methadone                   <25               <25 ng/mL           EDDP                        <25               <25 ng/mL           6-Acetylmorphine            <25               <25 ng/mL           Codeine                     <50               <50 ng/mL           Hydrocodone                 <25               <25 ng/mL           Hydromorphone               <25               <25 ng/mL           Morphine                    <50               <50 ng/mL           Norhydrocodone              <25               <25 ng/mL           Noroxycodone                <25               <25 ng/mL           Oxycodone                   <25               <25 ng/mL           Oxymorphone                 <25               <25 ng/mL           Fentanyl                    <2.5              <2.5 ng/mL          Norfentanyl                 <2.5              <2.5 ng/mL          Tramadol                    <50               <50 ng/mL           O-Desmethyltramadol         <50               <50 ng/mL           Zolpidem                    <25               <25 ng/mL           Zolpidem Metabolite (Z*     <25               <25 ng/mL      -Screen Opiate/Opioid/Benzo Prescription Compliance:   Collection Time: 02/05/24  4:08 PM       Result                      Value             Ref Range           Creatinine, Urine Rand*     116.8             20.0 - 320.0*       Amphetamine Screen, Ur*                       Presumptive *   Presumptive Negative       Barbiturate Screen, Ur*                       Presumptive *   Presumptive Negative       Cannabinoid Screen, Ur*                       Presumptive *   Presumptive Negative       Cocaine Metabolite Scr*                       Presumptive *   Presumptive Negative       PCP Screen, Urine                             Presumptive *   Presumptive Negative  N/A    Controlled Substance Agreement:  Date of the Last Agreement: 2/5/24  Reviewed Controlled Substance Agreement including but not limited to the  "benefits, risks, and alternatives to treatment with a Controlled Substance medication(s).    Benzodiazepines:  What is the patient's goal of therapy? Manage chronic anxiety  Is this being achieved with current treatment? yes    LAMONT-7:  No data recorded    Activities of Daily Living:   Is your overall impression that this patient is benefiting (symptom reduction outweighs side effects) from benzodiazepine therapy? Yes     1. Physical Functioning: Better  2. Family Relationship: Better  3. Social Relationship: Better  4. Mood: Better  5. Sleep Patterns: Better  6. Overall Function: Better          Review of Systems    Objective   /80   Pulse 85   Temp 36.6 °C (97.9 °F) (Temporal)   Ht 1.676 m (5' 6\")   Wt 77.3 kg (170 lb 6.4 oz)   SpO2 97%   BMI 27.50 kg/m²     Physical Exam  Vitals reviewed.   Constitutional:       Appearance: Normal appearance.   Cardiovascular:      Rate and Rhythm: Normal rate and regular rhythm.      Heart sounds: No murmur heard.  Pulmonary:      Effort: Pulmonary effort is normal.      Breath sounds: Normal breath sounds.   Musculoskeletal:      Right lower leg: No edema.      Left lower leg: No edema.   Neurological:      General: No focal deficit present.      Mental Status: She is alert. Mental status is at baseline.   Psychiatric:         Mood and Affect: Mood normal.         Behavior: Behavior normal.           Assessment/Plan   Problem List Items Addressed This Visit       Generalized anxiety disorder - Primary    Relevant Medications    ALPRAZolam (Xanax) 1 mg tablet    FLUoxetine (PROzac) 20 mg capsule    Other Relevant Orders    Opiate/Opioid/Benzo Prescription Compliance     Other Visit Diagnoses       Colon cancer screening        Relevant Orders    Cologuard® colon cancer screening               "

## 2025-01-11 VITALS
OXYGEN SATURATION: 97 % | TEMPERATURE: 97.9 F | SYSTOLIC BLOOD PRESSURE: 118 MMHG | DIASTOLIC BLOOD PRESSURE: 80 MMHG | WEIGHT: 170.4 LBS | BODY MASS INDEX: 27.38 KG/M2 | HEART RATE: 85 BPM | HEIGHT: 66 IN

## 2025-01-11 RX ORDER — FLUOXETINE HYDROCHLORIDE 20 MG/1
CAPSULE ORAL
Qty: 180 CAPSULE | Refills: 1 | Status: SHIPPED | OUTPATIENT
Start: 2025-01-11

## 2025-01-11 RX ORDER — ALPRAZOLAM 1 MG/1
1 TABLET ORAL 2 TIMES DAILY PRN
Qty: 60 TABLET | Refills: 1 | Status: SHIPPED | OUTPATIENT
Start: 2025-01-11 | End: 2025-03-12

## 2025-01-11 NOTE — PATIENT INSTRUCTIONS
Chronic anxiety,  worsened by stress.  Continue fluoxetine daily, alprazolam as needed.   Urine Drug Screen ordered today (will be done at [facility]), and Controlled Substance Agreement  done 2/5/24.  Follow up 3 months.     BP has been elevated on home monitor, but is good today in office.   Taking hydrochlorothiazide 12.5 mg, losartan 50 mg, metoprolol ER 50 mg,  See if BP can be measured manually at work, and send your readings.  If it is running high, losartan will be increased.    For headache, can use OTC analgesics.  Also discussed starting to wear glasses, and see if that helps.  If it persists, will evaluate further.

## 2025-01-17 ENCOUNTER — PATIENT MESSAGE (OUTPATIENT)
Dept: PRIMARY CARE | Facility: CLINIC | Age: 60
End: 2025-01-17
Payer: COMMERCIAL

## 2025-02-01 LAB — NONINV COLON CA DNA+OCC BLD SCRN STL QL: NEGATIVE

## 2025-03-20 DIAGNOSIS — F41.1 GENERALIZED ANXIETY DISORDER: ICD-10-CM

## 2025-03-20 RX ORDER — ALPRAZOLAM 1 MG/1
1 TABLET ORAL 2 TIMES DAILY PRN
Qty: 60 TABLET | Refills: 0 | Status: SHIPPED | OUTPATIENT
Start: 2025-03-20 | End: 2025-05-19

## 2025-04-22 DIAGNOSIS — F41.1 GENERALIZED ANXIETY DISORDER: ICD-10-CM

## 2025-04-22 RX ORDER — FLUOXETINE HYDROCHLORIDE 20 MG/1
CAPSULE ORAL
Qty: 180 CAPSULE | Refills: 0 | Status: SHIPPED | OUTPATIENT
Start: 2025-04-22

## 2025-04-22 RX ORDER — ALPRAZOLAM 1 MG/1
1 TABLET ORAL 2 TIMES DAILY PRN
Qty: 60 TABLET | Refills: 0 | Status: SHIPPED | OUTPATIENT
Start: 2025-04-22 | End: 2025-06-21

## 2025-04-23 ENCOUNTER — TELEPHONE (OUTPATIENT)
Dept: PRIMARY CARE | Facility: CLINIC | Age: 60
End: 2025-04-23
Payer: COMMERCIAL

## 2025-05-23 DIAGNOSIS — F41.1 GENERALIZED ANXIETY DISORDER: ICD-10-CM

## 2025-05-23 RX ORDER — ALPRAZOLAM 1 MG/1
1 TABLET ORAL 2 TIMES DAILY PRN
Qty: 60 TABLET | Refills: 0 | Status: SHIPPED | OUTPATIENT
Start: 2025-05-23 | End: 2025-07-22

## 2025-06-09 ENCOUNTER — APPOINTMENT (OUTPATIENT)
Dept: PRIMARY CARE | Facility: CLINIC | Age: 60
End: 2025-06-09
Payer: COMMERCIAL

## 2025-06-09 DIAGNOSIS — R14.0 ABDOMINAL BLOATING: Primary | ICD-10-CM

## 2025-06-09 DIAGNOSIS — I10 BENIGN ESSENTIAL HTN: ICD-10-CM

## 2025-06-09 DIAGNOSIS — F41.1 GENERALIZED ANXIETY DISORDER: ICD-10-CM

## 2025-06-09 PROCEDURE — 1036F TOBACCO NON-USER: CPT | Performed by: FAMILY MEDICINE

## 2025-06-09 PROCEDURE — 99213 OFFICE O/P EST LOW 20 MIN: CPT | Performed by: FAMILY MEDICINE

## 2025-06-09 NOTE — PATIENT INSTRUCTIONS
Chronic anxiety, stable on current medication.  Continue fluoxetine daily, alprazolam as needed.   Urine Drug Screen done Jan 2025,  Controlled Substance Agreement  done 2/5/24.  Follow up 3 months.     BP good when checked at work.  Taking hydrochlorothiazide 12.5 mg, losartan 50 mg, metoprolol ER 50 mg,               Bloating for 3 months.  Check blood work to start.  Depending on results, may need abdominal ultrasound.    Fast for 12 hours prior to having blood drawn.  You should drink plenty of water, and can have black tea or black coffee, if you'd like.

## 2025-06-09 NOTE — PROGRESS NOTES
Subjective   Patient ID: Aleshia Crespo is a 59 y.o. female who presents for Anxiety.  Patient presents for follow up regarding anxiety, BP.  Still getting headache's about 3 times a week.  Manageable with OTC meds.    BP at work was good when checked today.  Doesn't have home monitor.  No side effects to medication.    Sleep is good.  Energy low for a while, but getting better.   Getting outside to do yard work.    Anxiety has been controlled with current medications.  Stress is a little better than it had been.    Complaint today is that she has been feeling bloated for about 3 months.  Only eats once a day, but feels like fills up and is distended.   Not much gas.  BM's are normal.  Had cologuard recently for colon cancer screening.  It doesn't matter what she eats.  Everything makes her fell that way.    OARRS:  No data recorded  I have personally reviewed the OARRS report for Aleshia Crespo. I have considered the risks of abuse, dependence, addiction and diversion and I believe that it is clinically appropriate for Aleshia Crespo to be prescribed this medication    Is the patient prescribed a combination of a benzodiazepine and opioid?  No    Last Urine Drug Screen / ordered today: No  No results found for this or any previous visit (from the past 8760 hours).  Results are as expected.     Controlled Substance Agreement:  Date of the Last Agreement: 2/5/24  Reviewed Controlled Substance Agreement including but not limited to the benefits, risks, and alternatives to treatment with a Controlled Substance medication(s).    Benzodiazepines:  What is the patient's goal of therapy? Manage anxiety  Is this being achieved with current treatment? yes    LAMONT-7:  No data recorded    Activities of Daily Living:   Is your overall impression that this patient is benefiting (symptom reduction outweighs side effects) from benzodiazepine therapy? Yes     1. Physical Functioning: Better  2. Family Relationship:  Better  3. Social Relationship: Better  4. Mood: Better  5. Sleep Patterns: Better  6. Overall Function: Better          Review of Systems    Objective   There were no vitals taken for this visit.    Physical Exam  Constitutional:       Appearance: Normal appearance.   Pulmonary:      Effort: Pulmonary effort is normal.   Neurological:      Mental Status: She is alert.   Psychiatric:         Mood and Affect: Mood normal.         Behavior: Behavior normal.           Assessment/Plan   Problem List Items Addressed This Visit       Benign essential HTN    Relevant Orders    Lipid Panel    Generalized anxiety disorder     Other Visit Diagnoses         Abdominal bloating    -  Primary    Relevant Orders    CBC    Comprehensive Metabolic Panel    TSH with reflex to Free T4 if abnormal

## 2025-06-15 DIAGNOSIS — R14.0 ABDOMINAL BLOATING: Primary | ICD-10-CM

## 2025-06-15 DIAGNOSIS — I10 BENIGN ESSENTIAL HTN: ICD-10-CM

## 2025-06-25 DIAGNOSIS — F41.1 GENERALIZED ANXIETY DISORDER: ICD-10-CM

## 2025-06-25 DIAGNOSIS — I10 BENIGN ESSENTIAL HTN: ICD-10-CM

## 2025-06-25 RX ORDER — HYDROCHLOROTHIAZIDE 12.5 MG/1
12.5 TABLET ORAL DAILY
Qty: 90 TABLET | Refills: 0 | Status: SHIPPED | OUTPATIENT
Start: 2025-06-25

## 2025-06-25 RX ORDER — LOSARTAN POTASSIUM 50 MG/1
50 TABLET ORAL DAILY
Qty: 90 TABLET | Refills: 0 | Status: SHIPPED | OUTPATIENT
Start: 2025-06-25

## 2025-06-25 RX ORDER — ALPRAZOLAM 1 MG/1
1 TABLET ORAL 2 TIMES DAILY PRN
Qty: 60 TABLET | Refills: 0 | Status: SHIPPED | OUTPATIENT
Start: 2025-06-25 | End: 2025-08-24

## 2025-06-25 RX ORDER — FLUOXETINE 20 MG/1
CAPSULE ORAL
Qty: 180 CAPSULE | Refills: 0 | Status: SHIPPED | OUTPATIENT
Start: 2025-06-25

## 2025-06-30 DIAGNOSIS — R73.09 ELEVATED GLUCOSE: Primary | ICD-10-CM

## 2025-06-30 DIAGNOSIS — R74.8 ELEVATED ALKALINE PHOSPHATASE LEVEL: ICD-10-CM

## 2025-07-22 DIAGNOSIS — F41.1 GENERALIZED ANXIETY DISORDER: ICD-10-CM

## 2025-07-23 RX ORDER — ALPRAZOLAM 1 MG/1
1 TABLET ORAL 2 TIMES DAILY PRN
Qty: 60 TABLET | Refills: 0 | Status: SHIPPED | OUTPATIENT
Start: 2025-07-23

## 2025-08-20 DIAGNOSIS — F41.1 GENERALIZED ANXIETY DISORDER: ICD-10-CM

## 2025-08-21 RX ORDER — ALPRAZOLAM 1 MG/1
1 TABLET ORAL 2 TIMES DAILY PRN
Qty: 60 TABLET | Refills: 0 | Status: SHIPPED | OUTPATIENT
Start: 2025-08-21